# Patient Record
Sex: FEMALE | Race: BLACK OR AFRICAN AMERICAN | NOT HISPANIC OR LATINO | Employment: OTHER | URBAN - METROPOLITAN AREA
[De-identification: names, ages, dates, MRNs, and addresses within clinical notes are randomized per-mention and may not be internally consistent; named-entity substitution may affect disease eponyms.]

---

## 2019-12-20 PROBLEM — K92.1 MELENA: Status: ACTIVE | Noted: 2019-12-20

## 2019-12-20 PROBLEM — K92.2 GASTROINTESTINAL HEMORRHAGE: Status: ACTIVE | Noted: 2019-12-20

## 2019-12-21 PROBLEM — C21.0 ANAL CANCER: Status: ACTIVE | Noted: 2019-12-21

## 2019-12-21 PROBLEM — G43.909 MIGRAINE: Status: ACTIVE | Noted: 2019-12-21

## 2019-12-21 PROBLEM — I73.9 PAD (PERIPHERAL ARTERY DISEASE): Status: ACTIVE | Noted: 2019-12-21

## 2019-12-21 PROBLEM — K92.2 GASTROINTESTINAL HEMORRHAGE: Status: ACTIVE | Noted: 2019-12-21

## 2019-12-21 PROBLEM — N18.6 ESRD (END STAGE RENAL DISEASE): Status: ACTIVE | Noted: 2019-12-21

## 2019-12-23 PROBLEM — Z99.2 ESRD ON DIALYSIS: Status: ACTIVE | Noted: 2019-12-21

## 2019-12-23 PROBLEM — I10 HTN (HYPERTENSION): Status: ACTIVE | Noted: 2019-12-23

## 2020-03-28 PROBLEM — N18.6 ESRD (END STAGE RENAL DISEASE): Status: ACTIVE | Noted: 2020-03-28

## 2020-03-28 PROBLEM — E11.9 TYPE 2 DIABETES MELLITUS: Status: ACTIVE | Noted: 2020-03-28

## 2020-03-28 PROBLEM — I63.9 CEREBROVASCULAR ACCIDENT (CVA): Status: ACTIVE | Noted: 2020-03-28

## 2020-03-28 PROBLEM — G81.90 HEMIPARESIS: Status: ACTIVE | Noted: 2020-03-28

## 2020-03-28 PROBLEM — I10 ESSENTIAL HYPERTENSION: Status: ACTIVE | Noted: 2020-03-28

## 2020-04-02 PROBLEM — E83.39 HYPERPHOSPHATEMIA: Status: ACTIVE | Noted: 2020-04-02

## 2020-04-02 PROBLEM — Z86.73 RECENT CEREBROVASCULAR ACCIDENT (CVA): Status: ACTIVE | Noted: 2020-04-02

## 2020-04-02 PROBLEM — I16.0 HYPERTENSIVE URGENCY: Status: ACTIVE | Noted: 2020-04-02

## 2020-04-02 PROBLEM — E83.39 HYPERPHOSPHATEMIA: Status: RESOLVED | Noted: 2020-04-02 | Resolved: 2020-04-02

## 2020-04-02 PROBLEM — R26.81 GAIT INSTABILITY: Status: ACTIVE | Noted: 2020-04-02

## 2020-04-02 PROBLEM — R26.81 GAIT INSTABILITY: Status: RESOLVED | Noted: 2020-04-02 | Resolved: 2020-04-02

## 2020-04-02 PROBLEM — I16.0 HYPERTENSIVE URGENCY: Status: RESOLVED | Noted: 2020-04-02 | Resolved: 2020-04-02

## 2020-04-02 PROBLEM — C21.0 ANAL CANCER: Status: RESOLVED | Noted: 2019-12-21 | Resolved: 2020-04-02

## 2020-04-02 PROBLEM — E11.9 DIABETES MELLITUS WITHOUT COMPLICATION: Status: ACTIVE | Noted: 2020-04-02

## 2020-09-09 PROBLEM — C21.0 MALIGNANT NEOPLASM OF ANUS: Status: ACTIVE | Noted: 2020-09-09

## 2023-10-16 PROBLEM — G93.41 ENCEPHALOPATHY, METABOLIC: Status: ACTIVE | Noted: 2023-10-16

## 2023-10-16 PROBLEM — J81.0 ACUTE PULMONARY EDEMA: Status: ACTIVE | Noted: 2023-10-16

## 2023-10-16 PROBLEM — R41.82 ALTERED MENTAL STATUS, UNSPECIFIED: Status: ACTIVE | Noted: 2023-10-16

## 2023-10-16 PROBLEM — K62.6 RECTAL/ANAL ULCER: Status: ACTIVE | Noted: 2023-10-16

## 2023-10-16 PROBLEM — R19.7 DIARRHEA: Status: ACTIVE | Noted: 2023-10-16

## 2023-10-16 PROBLEM — Z86.73 RECENT CEREBROVASCULAR ACCIDENT (CVA): Status: RESOLVED | Noted: 2020-04-02 | Resolved: 2023-10-16

## 2023-10-16 PROBLEM — I49.9 IRREGULAR HEARTBEAT: Status: ACTIVE | Noted: 2023-10-16

## 2023-10-16 PROBLEM — E11.9 DIABETES MELLITUS WITHOUT COMPLICATION: Status: RESOLVED | Noted: 2020-04-02 | Resolved: 2023-10-16

## 2023-10-17 PROBLEM — Z59.819 HOUSING INSECURITY: Status: ACTIVE | Noted: 2023-10-17

## 2023-10-20 ENCOUNTER — PATIENT OUTREACH (OUTPATIENT)
Dept: ADMINISTRATIVE | Facility: CLINIC | Age: 68
End: 2023-10-20

## 2023-10-20 NOTE — PROGRESS NOTES
C3 nurse spoke with Rakel Eisenberg for a TCC post hospital discharge follow up call. The patient reports does not have a scheduled HOSFU appointment. C3 nurse was unable to schedule [facility]. Patient advised to contact their PCP to schedule a HOSPFU within 5-7 days.      The patient is not within an NP serviceable area.

## 2023-10-30 ENCOUNTER — PATIENT OUTREACH (OUTPATIENT)
Dept: ADMINISTRATIVE | Facility: HOSPITAL | Age: 68
End: 2023-10-30

## 2023-10-30 DIAGNOSIS — Z12.31 BREAST CANCER SCREENING BY MAMMOGRAM: Primary | ICD-10-CM

## 2023-10-30 DIAGNOSIS — E11.9 DIABETES MELLITUS WITHOUT COMPLICATION: ICD-10-CM

## 2024-01-15 PROBLEM — I63.9 CEREBROVASCULAR ACCIDENT (CVA): Status: RESOLVED | Noted: 2020-03-28 | Resolved: 2024-01-15

## 2024-01-15 PROBLEM — J81.0 ACUTE PULMONARY EDEMA: Status: RESOLVED | Noted: 2023-10-16 | Resolved: 2024-01-15

## 2024-01-31 PROBLEM — I50.42 CHRONIC COMBINED SYSTOLIC AND DIASTOLIC HEART FAILURE: Status: ACTIVE | Noted: 2024-01-31
